# Patient Record
Sex: FEMALE | Race: WHITE | Employment: OTHER | URBAN - METROPOLITAN AREA
[De-identification: names, ages, dates, MRNs, and addresses within clinical notes are randomized per-mention and may not be internally consistent; named-entity substitution may affect disease eponyms.]

---

## 2018-11-20 PROBLEM — R45.851 DEPRESSION WITH SUICIDAL IDEATION: Status: ACTIVE | Noted: 2018-11-20

## 2021-08-19 PROBLEM — F33.2 SEVERE EPISODE OF RECURRENT MAJOR DEPRESSIVE DISORDER, WITHOUT PSYCHOTIC FEATURES (HCC): Status: RESOLVED | Noted: 2018-11-21 | Resolved: 2021-08-19

## 2021-08-19 PROBLEM — R06.02 SOB (SHORTNESS OF BREATH): Status: RESOLVED | Noted: 2021-08-19 | Resolved: 2021-08-19

## 2021-08-19 PROBLEM — G90.A POTS (POSTURAL ORTHOSTATIC TACHYCARDIA SYNDROME): Status: ACTIVE | Noted: 2021-08-19

## 2023-01-25 ENCOUNTER — HOSPITAL ENCOUNTER (EMERGENCY)
Age: 38
Discharge: HOME | End: 2023-01-25
Payer: MEDICAID

## 2023-01-25 ENCOUNTER — APPOINTMENT (OUTPATIENT)
Dept: GENERAL RADIOLOGY | Age: 38
End: 2023-01-25
Payer: MEDICAID

## 2023-01-25 VITALS
DIASTOLIC BLOOD PRESSURE: 89 MMHG | OXYGEN SATURATION: 99 % | RESPIRATION RATE: 20 BRPM | TEMPERATURE: 98.5 F | HEART RATE: 89 BPM | SYSTOLIC BLOOD PRESSURE: 128 MMHG

## 2023-01-25 DIAGNOSIS — S86.912A STRAIN OF LEFT KNEE, INITIAL ENCOUNTER: Primary | ICD-10-CM

## 2023-01-25 PROCEDURE — 99283 EMERGENCY DEPT VISIT LOW MDM: CPT

## 2023-01-25 PROCEDURE — 6370000000 HC RX 637 (ALT 250 FOR IP)

## 2023-01-25 PROCEDURE — 73562 X-RAY EXAM OF KNEE 3: CPT

## 2023-01-25 RX ORDER — HYDROCODONE BITARTRATE AND ACETAMINOPHEN 5; 325 MG/1; MG/1
1 TABLET ORAL ONCE
Status: COMPLETED | OUTPATIENT
Start: 2023-01-25 | End: 2023-01-25

## 2023-01-25 RX ORDER — HYDROCODONE BITARTRATE AND ACETAMINOPHEN 5; 325 MG/1; MG/1
TABLET ORAL
Status: COMPLETED
Start: 2023-01-25 | End: 2023-01-25

## 2023-01-25 RX ORDER — NAPROXEN 500 MG/1
500 TABLET ORAL 2 TIMES DAILY
Qty: 14 TABLET | Refills: 0 | Status: SHIPPED | OUTPATIENT
Start: 2023-01-25 | End: 2023-02-01

## 2023-01-25 RX ORDER — HYDROCODONE BITARTRATE AND ACETAMINOPHEN 5; 325 MG/1; MG/1
1 TABLET ORAL EVERY 6 HOURS PRN
Qty: 6 TABLET | Refills: 0 | Status: SHIPPED | OUTPATIENT
Start: 2023-01-25 | End: 2023-01-28

## 2023-01-25 RX ADMIN — HYDROCODONE BITARTRATE AND ACETAMINOPHEN 1 TABLET: 5; 325 TABLET ORAL at 14:30

## 2023-01-25 ASSESSMENT — PAIN DESCRIPTION - ORIENTATION
ORIENTATION: LEFT
ORIENTATION: LEFT

## 2023-01-25 ASSESSMENT — PAIN DESCRIPTION - LOCATION
LOCATION: KNEE
LOCATION: KNEE

## 2023-01-25 ASSESSMENT — PAIN SCALES - GENERAL
PAINLEVEL_OUTOF10: 6
PAINLEVEL_OUTOF10: 6
PAINLEVEL_OUTOF10: 7

## 2023-01-25 ASSESSMENT — PAIN - FUNCTIONAL ASSESSMENT: PAIN_FUNCTIONAL_ASSESSMENT: 0-10

## 2023-01-25 NOTE — Clinical Note
Jf Valdovinos was seen and treated in our emergency department on 1/25/2023. She may return to work on 01/27/2023. If you have any questions or concerns, please don't hesitate to call.       GUSTAVO Iyer

## 2023-01-25 NOTE — ED PROVIDER NOTES
Independent [facility] Visit. HPI:  23, Time: 1:27 PM CA Cortés is a 30 y.o. female presenting to the ED for left knee pain, beginning 1 day ago. The complaint has been persistent, moderate in severity, and worsened by walking. Patient comes in with complaint of left knee pain that started yesterday when she was walking down steps she states after she got to the bottom of the steps she was unable to weight-bear she denied any injury. She feels like the knee wants to give out on her. She denies any previous knee problems. Patient took Motrin at 15 PM patient denies any back pain no pain radiating down the leg no calf pain. No numbness tingling or weakness. Review of Systems:   A complete review of systems was performed and pertinent positives and negatives are stated within HPI, all other systems reviewed and are negative.          --------------------------------------------- PAST HISTORY ---------------------------------------------  Past Medical History:  has a past medical history of Anxiety and Hypertension. Past Surgical History:  has a past surgical history that includes  section; Cholecystectomy; Tubal ligation; Cervix surgery; [place] tooth extraction; and Stomach surgery (2021). Social History:  reports that she has been smoking cigarettes. She has been smoking an average of .5 packs per day. She has never used smokeless tobacco. She reports current alcohol use. She reports that she does not use drugs. Family History: family history includes Heart Attack in her paternal grandmother. The patients home medications have been reviewed. Allergies: Patient has no known allergies. -------------------------------------------------- RESULTS -------------------------------------------------  All laboratory and radiology results have been personally reviewed by myself   LABS:  No results found for this visit on 23.     RADIOLOGY:  Interpreted by Radiologist.  XR KNEE LEFT (3 VIEWS)   Final Result   No acute abnormality of the knee.             ------------------------- NURSING NOTES AND VITALS REVIEWED ---------------------------   The nursing notes within the ED encounter and vital signs as below have been reviewed. /89   Pulse 89   Temp 98.5 °F (36.9 °C)   Resp 20   LMP 01/08/2023   SpO2 99%   Oxygen Saturation Interpretation: Normal      ---------------------------------------------------PHYSICAL EXAM--------------------------------------      Constitutional/General: Alert and oriented x3, well appearing, non toxic in NAD  Head: Normocephalic and atraumatic  Eyes: PERRL, EOMI  Mouth: Oropharynx clear, handling secretions, no trismus  Neck: Supple, full ROM,   Pulmonary: Lungs clear to auscultation bilaterally, no wheezes, rales, or rhonchi. Not in respiratory distress  Cardiovascular:  Regular rate and rhythm, no murmurs, gallops, or rubs. 2+ distal pulses  Abdomen: Soft, non tender, non distended,   Extremities: Moves all extremities x 4. Warm and well perfused tender anterior patella joint integrity intact pulses normal  Skin: warm and dry without rash  Neurologic: GCS 15,  Psych: Normal Affect      ------------------------------ ED COURSE/MEDICAL DECISION MAKING----------------------  Medications   HYDROcodone-acetaminophen (NORCO) 5-325 MG per tablet 1 tablet (1 tablet Oral Given 1/25/23 1430)         ED COURSE:   Medical Decision Making  Patient is historian  Patient presents to the ER for left knee pain. Social Determinants include   Social Connections: Not on file    Social Determinants : None. Chronic conditions    Past Medical History:   Diagnosis Date    Anxiety     Hypertension    . Physical exam generalized tenderness of the anterior knee very joint integrity appears to be intact pulses normal..  Vital signs     /89Temp   98.5 °F    (36.9 °C)  Heart Rate 89Resp 20SpO2 99%Wt --Ht --BMI 30.71 kg/m²Pain Level 7  . Differential diagnoses include but not limited to fracture dislocation of the knee septic knee, bursitis of the knee DVT. Diagnostic studies revealed no fracture dislocation of the right knee present. Consults included none. Results were discussed with patient. Patient was given Norco 5/325 mg p.o. x1 for their symptoms with moderate improvement. Patient will be discharged home with the following prescriptions, Naprosyn 500 mg every 12 hours as needed Norco 5/325 mg every 4-6 hours as needed #12. Discussed appropriate use and potential side effects of starting the prescribed medications. Patient continues to be non-toxic on re-evaluation. Findings were discussed with the patient and reasons to immediately return to the ED were articulated to them. Patient was placed in a knee immobilizer given crutches they will follow-up with their PMD and orthopedic surgery. Discharge Instructions:   Patient referred to  DO Danny Ortega [place] [id number] [phone]    Call in 1 day      Shraddha Morton[address]  [phone]    Call in 1 day      MEDICATIONS:   DISCHARGE MEDICATIONS:  Discharge Medication List as of 1/25/2023  3:27 PM        START taking these medications    Details   HYDROcodone-acetaminophen (NORCO) 5-325 MG per tablet Take 1 tablet by mouth every 6 hours as needed for Pain for up to 3 days. Intended supply: 3 days. Take lowest dose possible to manage pain Max Daily Amount: 4 tablets, Disp-6 tablet, R-0Normal      naproxen (NAPROSYN) 500 MG tablet Take 1 tablet by mouth 2 times daily for 7 days, Disp-14 tablet, R-0Normal             DISCONTINUED MEDICATIONS:  Discharge Medication List as of 1/25/2023  3:27 PM          Record Review:  Records Reviewed : Source recent emergency encounter       Disposition Considerations:   This patient's ED course included: a personal history and physicial examination  This patient has remained hemodynamically stable during their ED course. I emphasized the importance of follow-up with the physician I referred them to in the timeframe recommended. I discussed with the patient emergent symptoms and the need to immediately return to the ER. Written information was included in their discharge instructions. Additional verbal discharge instructions were also given and discussed with the patient to supplement those generated by the EMR. We also discussed medications that were prescribed  (if any) including common side effects and interactions. The patient was advised to abstain from driving, operating heavy machinery or making significant decisions while taking medications such as opiates and muscle relaxers that may impair this. All questions were addressed. They understand return precautions and discharge instructions. The patient  expressed understanding. Vitals were stable and they were in no distress at discharge. Counseling: The emergency provider has spoken with the patient and discussed todays results, in addition to providing specific details for the plan of care and counseling regarding the diagnosis and prognosis. Questions are answered at this time and they are agreeable with the plan. NOTE: This report was transcribed using voice recognition software.  Every effort was made to ensure accuracy; however, inadvertent computerized transcription errors may be present      Nancy Smith  01/25/23 2916

## 2023-01-25 NOTE — ED NOTES
Discussed with the patient and all questioned fully answered. She will call me if any problems arise.        Mar Lane RN  01/25/23 [id number]

## 2023-01-30 ENCOUNTER — OFFICE VISIT (OUTPATIENT)
Dept: ORTHOPEDIC SURGERY | Age: 38
End: 2023-01-30
Payer: MEDICAID

## 2023-01-30 VITALS — WEIGHT: 165 LBS | HEIGHT: 62 IN | TEMPERATURE: 98 F | BODY MASS INDEX: 30.36 KG/M2

## 2023-01-30 DIAGNOSIS — M25.562 ACUTE PAIN OF LEFT KNEE: Primary | ICD-10-CM

## 2023-01-30 DIAGNOSIS — M67.52 PLICA SYNDROME OF LEFT KNEE: ICD-10-CM

## 2023-01-30 PROCEDURE — G8427 DOCREV CUR MEDS BY ELIG CLIN: HCPCS | Performed by: ORTHOPAEDIC SURGERY

## 2023-01-30 PROCEDURE — G8484 FLU IMMUNIZE NO ADMIN: HCPCS | Performed by: ORTHOPAEDIC SURGERY

## 2023-01-30 PROCEDURE — G8417 CALC BMI ABV UP PARAM F/U: HCPCS | Performed by: ORTHOPAEDIC SURGERY

## 2023-01-30 PROCEDURE — 99203 OFFICE O/P NEW LOW 30 MIN: CPT | Performed by: ORTHOPAEDIC SURGERY

## 2023-01-30 PROCEDURE — 4004F PT TOBACCO SCREEN RCVD TLK: CPT | Performed by: ORTHOPAEDIC SURGERY

## 2023-01-30 NOTE — PROGRESS NOTES
David Michael is a 30 y.o. female, who presents   Chief Complaint   Patient presents with    Knee Pain     Left knee pain DOI 1/24/23. Patient was coming down some stairs and had some pain in the leg. Placed in straight leg brace at hospital. Mostly hurts when knee is straightened. Also walking with crutches although she believes she can walk without them. HPI[de-identified] Patient lady experienced knee pain about 6 days ago after going down stairs at home. She did not fall or slip. She did not have any local contact on the knee. She simply had an odd feeling in the knee. She went to the emergency 1/25/2023 and was evaluated there and was x-rayed and placed in a knee immobilizer which she does not particularly care for. Discomfort was around the front of the knee. Did not have great deal of swelling and no discoloration. Allergies; medications; past medical, surgical, family, and social history; and problem list have been reviewed today and updated as indicated in this encounter - see below following Ortho specifics. Musculoskeletal: Skin condition gross neurovascular function are good in the left lower extremity. Hip range of motion is good with no instability or pain. Knee range of motion is fairly good with some discomfort and guarding and also a repeatable click in the anterolateral aspect adjacent to the patella. She has collaterals and cruciate ligaments which are stable and no signs of meniscal pathology. There is no effusion at the time seen here. Her calf is supple without tenderness. It is little bit guarded because of her apprehension. Radiologic Studies: Imaging of the knee shows grossly normal left knee anatomy. ASSESSMENT:  Efe was seen today for knee pain.     Diagnoses and all orders for this visit:    Acute pain of left knee  -     External Referral To Physical Therapy    Plica syndrome of left knee     Treatment alternatives were reviewed including medical and physical therapies, injections, and surgical options, expected risks benefits and likely outcome of each were discussed in detail, questions asked and answered and understood. We discussed the symptoms as well as physical findings and imaging results. Her  was present throughout the evaluation. Find no evidence of meniscal or meniscal damage. The crepitus seems more typical of plica. Discussed treatment options and in the interest in keeping things conservative courage physical therapy. PLAN: We will schedule outpatient therapy and follow-up in 3 to 4 weeks to make further recommendations depending on her progress. Patient Active Problem List   Diagnosis    Depression with suicidal ideation    POTS (postural orthostatic tachycardia syndrome)       Past Medical History:   Diagnosis Date    Anxiety     Hypertension        Past Surgical History:   Procedure Laterality Date    CERVIX SURGERY       SECTION      CHOLECYSTECTOMY      STOMACH SURGERY  2021    TUBAL LIGATION      WISDOM TOOTH EXTRACTION         Current Outpatient Medications   Medication Sig Dispense Refill    naproxen (NAPROSYN) 500 MG tablet Take 1 tablet by mouth 2 times daily for 7 days 14 tablet 0    metoprolol succinate (TOPROL XL) 50 MG extended release tablet Take 50 mg by mouth daily       No current facility-administered medications for this visit.        No Known Allergies    Social History     Socioeconomic History    Marital status:      Spouse name: None    Number of children: 4    Years of education: 14    Highest education level: None   Occupational History     Employer: NONE   Tobacco Use    Smoking status: Every Day     Packs/day: 0.50     Types: Cigarettes    Smokeless tobacco: Never   Vaping Use    Vaping Use: Never used   Substance and Sexual Activity    Alcohol use: Yes     Comment: social    Drug use: No    Sexual activity: Not Currently     Partners: Male       Family History   Problem Relation Age of Onset    Heart Attack Paternal Grandmother          Review of Systems:   As follows except as previously noted in HPI:  Constitutional: Negative for chills, diaphoresis,  fever   Respiratory: Negative for cough, shortness of breath and wheezing. Cardiovascular: Negative for chest pain and palpitations. Neurological: Negative for dizziness, syncope,   GI / : abdominal pain or cramping  Musculoskeletal: see HPI       Objective:   Physical Exam   Constitutional: Oriented to person, place, and time. and appears well-developed and well-nourished. :   Head: Normocephalic and atraumatic. Neck: Neck supple. Eyes: EOM are normal.   Pulmonary/Chest: Effort normal.  No respiratory distress, no wheezes. Neurological: Alert and oriented to person  Skin: Skin is warm and dry. Olga Hardin DO    1/30/23  4:37 PM    All reasonable efforts have been made to minimize the risk of errors that may occur in the use of voice recognition and other electronic means of charting.